# Patient Record
Sex: MALE | Race: WHITE | NOT HISPANIC OR LATINO | Employment: FULL TIME | URBAN - METROPOLITAN AREA
[De-identification: names, ages, dates, MRNs, and addresses within clinical notes are randomized per-mention and may not be internally consistent; named-entity substitution may affect disease eponyms.]

---

## 2024-08-23 ENCOUNTER — OFFICE VISIT (OUTPATIENT)
Dept: FAMILY MEDICINE CLINIC | Facility: CLINIC | Age: 49
End: 2024-08-23
Payer: COMMERCIAL

## 2024-08-23 VITALS
HEART RATE: 61 BPM | OXYGEN SATURATION: 97 % | RESPIRATION RATE: 18 BRPM | DIASTOLIC BLOOD PRESSURE: 88 MMHG | HEIGHT: 74 IN | SYSTOLIC BLOOD PRESSURE: 128 MMHG | BODY MASS INDEX: 40.43 KG/M2 | WEIGHT: 315 LBS

## 2024-08-23 DIAGNOSIS — Z12.11 SCREENING FOR COLON CANCER: ICD-10-CM

## 2024-08-23 DIAGNOSIS — E66.01 CLASS 3 SEVERE OBESITY DUE TO EXCESS CALORIES WITHOUT SERIOUS COMORBIDITY WITH BODY MASS INDEX (BMI) OF 40.0 TO 44.9 IN ADULT: ICD-10-CM

## 2024-08-23 DIAGNOSIS — Z13.220 SCREENING, LIPID: Primary | ICD-10-CM

## 2024-08-23 DIAGNOSIS — Z12.5 SCREENING FOR PROSTATE CANCER: ICD-10-CM

## 2024-08-23 PROCEDURE — 36415 COLL VENOUS BLD VENIPUNCTURE: CPT | Performed by: STUDENT IN AN ORGANIZED HEALTH CARE EDUCATION/TRAINING PROGRAM

## 2024-08-23 PROCEDURE — 99204 OFFICE O/P NEW MOD 45 MIN: CPT | Performed by: STUDENT IN AN ORGANIZED HEALTH CARE EDUCATION/TRAINING PROGRAM

## 2024-08-23 RX ORDER — MOMETASONE FUROATE MONOHYDRATE 50 UG/1
2 SPRAY, METERED NASAL DAILY
COMMUNITY

## 2024-08-23 RX ORDER — CETIRIZINE HYDROCHLORIDE 10 MG/1
10 TABLET ORAL DAILY
COMMUNITY

## 2024-08-23 NOTE — PROGRESS NOTES
Wyatt Dumont,   [facility]  [address]  [place] [address]  [phone]    Subjective      Name Kian Cartagena MRN [id number]    1975 AGE/SEX 49 y.o. / male      Chief Complaint Chief Complaint   Patient presents with    Establish Care     New patient here to [facility], has not had a PCP in a long time          Visit History for  2024    Kian Cartagena is a 49 y.o. male who presented today for Establish Care (New patient here to establish [facility], has not had a PCP in a long time )     History of Present Illness  The patient presents for a physical exam.    He has not visited a doctor in a significant amount of time, as he rarely falls ill. When he does, he typically consults with a telemetry doctor or visits a clinic. He is committed to maintaining regular check-ups to ensure his long-term health.    A few years ago, he experienced frequent sinus infections, which led him to an ENT specialist for allergy testing. The results indicated seasonal allergies. Since then, he has been managing his symptoms with Zyrtec and a nasal spray, which have proven effective. He has not had a sinus infection for a considerable period.    His primary health concern is his weight. As he ages, he experiences increased stiffness and mild bone aches in the mornings. However, he does not have any major health concerns at present.    He did not fast today in preparation for potential blood tests. He occasionally suffers from motion sickness during cruises, for which he uses scopolamine patches.    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 He has been with the  for over 25 years now.    FAMILY HISTORY  He denies any family history of diabetes.       Medications and Allergies   Current Outpatient Medications   Medication Instructions    cetirizine (ZYRTEC) 10 mg, Oral, Daily    mometasone (NASONEX) 50 MCG/ACT nasal spray  "2 sprays, Nasal, Daily     No Known Allergies   I have reviewed the above medications and allergies     Objective:      Vitals Vitals:    08/23/24 1309   BP: 128/88   BP Location: Left arm   Patient Position: Sitting   Cuff Size: Large Adult   Pulse: 61   Resp: 18   SpO2: 97%   Weight: (!) 149 kg (328 lb)   Height: 188 cm (74\")     Body mass index is 42.11 kg/m².    Physical Exam  Vitals reviewed.   Constitutional:       General: He is not in acute distress.     Appearance: He is not ill-appearing.   HENT:      Right Ear: Tympanic membrane, ear canal and external ear normal.      Left Ear: Tympanic membrane, ear canal and external ear normal.   Cardiovascular:      Rate and Rhythm: Normal rate and regular rhythm.   Pulmonary:      Effort: Pulmonary effort is normal.      Breath sounds: Normal breath sounds.   Neurological:      Mental Status: He is alert.   Psychiatric:         Mood and Affect: Mood normal.         Behavior: Behavior normal.         Thought Content: Thought content normal.         Judgment: Judgment normal.        Physical Exam       Results       Assessment/Plan   Issues Addressed/ Plan   Diagnosis Plan   1. Screening, lipid  Lipid Panel      2. Class 3 severe obesity due to excess calories without serious comorbidity with body mass index (BMI) of 40.0 to 44.9 in adult  Comprehensive Metabolic Panel    Lipid Panel      3. Screening for prostate cancer  PSA SCREENING      4. Screening for colon cancer  Ambulatory Referral For Screening Colonoscopy         Assessment & Plan  1. Seasonal Allergies.  He reports a history of seasonal allergies, which have been well-controlled with Zyrtec and nasal spray. He has not experienced a sinus infection in a long time. He should continue his current regimen.    2. Health Maintenance.  He is due for a Tdap vaccine and a COVID-19 booster. The pneumonia vaccine is up-to-date, with the next one scheduled at age 67. A baseline PSA test is recommended. A colonoscopy " has been ordered as he is now 49 years old, and the guidelines recommend screening starting at age 45. Blood work will be conducted today to assess liver and kidney function, cholesterol levels, and prostate health. The results will be communicated via [facility].    Follow-up  A follow-up visit is scheduled in 3 months for a physical exam.     Class 3 Severe Obesity (BMI >=40). Obesity-related health conditions include the following: none. Obesity is unchanged. BMI is is above average; BMI management plan is completed. We discussed portion control and increasing exercise.     There are no Patient Instructions on file for this visit.   Follow up  recommended Return in about 3 months (around 11/23/2024) for Annual physical.   - Dragon voice recognition software was utilized to complete this chart.  Every reasonable attempt was made to edit and correct the text, however some incorrect words may remain.   Wyatt Dumont DO    Patient or patient representative verbalized consent for the use of Ambient Listening during the visit with  Wyatt Dumont DO for chart documentation. 9/2/2024  00:20 EDT

## 2024-08-23 NOTE — PROGRESS NOTES
Venipuncture Blood Specimen Collection  Venipuncture performed in LEFT ARM by Yeny Ahumada MA with good hemostasis. Patient tolerated the procedure well without complications.   08/23/24   Yeny Ahumada MA

## 2024-08-24 LAB
ALBUMIN SERPL-MCNC: 4.5 G/DL (ref 3.5–5.2)
ALBUMIN/GLOB SERPL: 1.7 G/DL
ALP SERPL-CCNC: 53 U/L (ref 39–117)
ALT SERPL-CCNC: 19 U/L (ref 1–41)
AST SERPL-CCNC: 12 U/L (ref 1–40)
BILIRUB SERPL-MCNC: 0.9 MG/DL (ref 0–1.2)
BUN SERPL-MCNC: 14 MG/DL (ref 6–20)
BUN/CREAT SERPL: 15.6 (ref 7–25)
CALCIUM SERPL-MCNC: 9.5 MG/DL (ref 8.6–10.5)
CHLORIDE SERPL-SCNC: 102 MMOL/L (ref 98–107)
CHOLEST SERPL-MCNC: 153 MG/DL (ref 0–200)
CO2 SERPL-SCNC: 29.4 MMOL/L (ref 22–29)
CREAT SERPL-MCNC: 0.9 MG/DL (ref 0.76–1.27)
EGFRCR SERPLBLD CKD-EPI 2021: 106 ML/MIN/1.73
GLOBULIN SER CALC-MCNC: 2.6 GM/DL
GLUCOSE SERPL-MCNC: 90 MG/DL (ref 65–99)
HDLC SERPL-MCNC: 46 MG/DL (ref 40–60)
LDLC SERPL CALC-MCNC: 86 MG/DL (ref 0–100)
POTASSIUM SERPL-SCNC: 4.8 MMOL/L (ref 3.5–5.2)
PROT SERPL-MCNC: 7.1 G/DL (ref 6–8.5)
PSA SERPL-MCNC: 0.3 NG/ML (ref 0–4)
SODIUM SERPL-SCNC: 141 MMOL/L (ref 136–145)
TRIGL SERPL-MCNC: 114 MG/DL (ref 0–150)
VLDLC SERPL CALC-MCNC: 21 MG/DL (ref 5–40)

## 2025-01-07 ENCOUNTER — OFFICE VISIT (OUTPATIENT)
Dept: FAMILY MEDICINE CLINIC | Facility: CLINIC | Age: 50
End: 2025-01-07
Payer: COMMERCIAL

## 2025-01-07 VITALS
HEIGHT: 74 IN | DIASTOLIC BLOOD PRESSURE: 72 MMHG | OXYGEN SATURATION: 96 % | SYSTOLIC BLOOD PRESSURE: 108 MMHG | WEIGHT: 315 LBS | BODY MASS INDEX: 40.43 KG/M2 | RESPIRATION RATE: 18 BRPM | HEART RATE: 62 BPM

## 2025-01-07 DIAGNOSIS — Z00.00 ENCOUNTER FOR WELL ADULT EXAM WITHOUT ABNORMAL FINDINGS: Primary | ICD-10-CM

## 2025-01-07 PROCEDURE — 99396 PREV VISIT EST AGE 40-64: CPT | Performed by: STUDENT IN AN ORGANIZED HEALTH CARE EDUCATION/TRAINING PROGRAM

## 2025-01-07 RX ORDER — FLUTICASONE PROPIONATE 50 MCG
2 SPRAY, SUSPENSION (ML) NASAL DAILY
COMMUNITY

## 2025-01-07 NOTE — PROGRESS NOTES
Wyatt Dumont DO  [facility]  [address]  [place] [address] [phone]    Subjective      Name Kian Cartagena MRN [id number]    1975 AGE/SEX 49 y.o. / male      Chief Complaint Chief Complaint   Patient presents with    Annual Exam        Visit History for  2025    History of Present Illness  iKan Cartagena 49 y.o. male who presents for an Annual Wellness Visit. He has a history of There is no problem list on file for this patient.    History of Present Illness  The patient presents for a routine checkup.    He reports experiencing soreness in his buttocks, hamstrings, and lower back, which he attributes to shoveling snow from his driveway. He has been managing this discomfort with ibuprofen. He is currently focused on weight loss and maintaining his health, with a particular emphasis on avoiding major injuries. His diet is generally balanced, with a preference for chicken or other proteins, rice, and vegetables for lunch, and high-protein foods for dinner. He admits to consuming more alcohol than usual during the holiday season but does not believe it was excessive. His exercise routine includes 45 minutes of cardio and 45 to 90 minutes of strength training, 4 to 5 days a week. He has been consistent with this routine, even during vacations. He has not exercised since Saturday but considers the snow removal activity as a form of workout. He has been incorporating more nuts, avocados, and fish into his diet to increase his HDL cholesterol levels. He reports no issues with urination or bowel movements. He acknowledges being overweight but notes that his blood work results have always been normal. He does not engage in binge eating and typically consumes a light breakfast and a sensible lunch. He has been incorporating more knee extensions and squats into his workout routine, which initially caused some discomfort but have since improved his overall  condition. He also performs back exercises, including rows and extensions. He occasionally does deadlifts but avoids heavy lifting to prevent injury.           Current Life Goals: Would like to move up in work.  Retire in 10 years.  Would like to lose some weight and stay injury free     Patient Care Team:  Wyatt Dumont, DO as PCP - General (Family Medicine)      Health Habits     Diet & Exercise:       Diet:     [x] Generally Healthy   [] Low Carb    [] Vegetarian     Did well over the holidays.       Exercise:     Type: cardiovascular workout on exercise equipment and weightlifting  Frequency: 5 times/week.       Tobacco Use:     Social History     Tobacco Use   Smoking Status Never    Passive exposure: Never   Smokeless Tobacco Never   Tobacco Comments    None        Kian Cartagena  reports that he has never smoked. He has never been exposed to tobacco smoke. He has never used smokeless tobacco.               Alcohol Use:     Social History     Substance and Sexual Activity   Alcohol Use Yes    Alcohol/week: 2.0 standard drinks of alcohol    Types: 2 Cans of beer per week    Comment: social         Counseling Given: [] Yes  [x] No       Dental Exam:   [] Up to date  [x] Scheduled  [] Needed   Last Exam: Jan 2025     Eye Exam:   [] Up to date  [x] Scheduled  [] Needed   Last Exam: Feb 2025     Screenings:     PHQ-9 Depression Screening  Little interest or pleasure in doing things? Not at all   Feeling down, depressed, or hopeless? Not at all   PHQ-2 Total Score 0   Trouble falling or staying asleep, or sleeping too much?     Feeling tired or having little energy?     Poor appetite or overeating?     Feeling bad about yourself - or that you are a failure or have let yourself or your family down?     Trouble concentrating on things, such as reading the newspaper or watching television?     Moving or speaking so slowly that other people could have noticed? Or the opposite - being so fidgety or restless that you have  "been moving around a lot more than usual?     Thoughts that you would be better off dead, or of hurting yourself in some way?     PHQ-9 Total Score     If you checked off any problems, how difficult have these problems made it for you to do your work, take care of things at home, or get along with other people?          Hepatitis C Screening:   No results found for: \"HEPCVIRUSABY\"    Lung Cancer Screening: Qualifies? [] Yes  [x] No   Completed:    Colon Cancer Screening:   Last Completed Colonoscopy       This patient has no relevant Health Maintenance data.             Prostate Cancer Screening:   PSA   Date Value Ref Range Status   08/23/2024 0.296 0.000 - 4.000 ng/mL Final     Comment:     Testing Method: [employer] Electrochemiluminescence  Immunoassay(ECLIA)  Values obtained with different assay methods or kits cannot  be used interchangeably.            Advance Care Planning  Patient does not have an advance directive, information provided.    Review of Systems    The following portions of the patient's history were reviewed and updated as appropriate: allergies, current medications, past family history, past medical history, past social history, past surgical history and problem list.     Past Medical, Family, Social History     Medical History: has a past medical history of Allergic, Kidney stone (2012), and Obesity.   Surgical History: has a past surgical history that includes Appendectomy (2018).   Family History: family history includes Alcohol abuse in his father; Clotting disorder in his father.   Social History: reports that he has never smoked. He has never been exposed to tobacco smoke. He has never used smokeless tobacco. He reports current alcohol use of about 2.0 standard drinks of alcohol per week. He reports that he does not use drugs.       Medications and Allergies   Current Outpatient Medications   Medication Instructions    cetirizine (ZYRTEC) 10 mg, Daily    fluticasone (FLONASE) 50 " "MCG/ACT nasal spray 2 sprays, Daily    mometasone (NASONEX) 50 MCG/ACT nasal spray 2 sprays, Daily     No Known Allergies       Objective:      Vitals Vitals:    01/07/25 1405   BP: 108/72   BP Location: Left arm   Patient Position: Sitting   Cuff Size: Large Adult   Pulse: 62   Resp: 18   SpO2: 96%   Weight: (!) 152 kg (334 lb)   Height: 188 cm (74\")     Body mass index is 42.88 kg/m².    Physical Exam  Vitals reviewed.   Constitutional:       General: He is not in acute distress.     Appearance: He is not ill-appearing.   Cardiovascular:      Rate and Rhythm: Normal rate and regular rhythm.   Pulmonary:      Effort: Pulmonary effort is normal.      Breath sounds: Normal breath sounds.   Neurological:      Mental Status: He is alert.   Psychiatric:         Mood and Affect: Mood normal.         Behavior: Behavior normal.         Thought Content: Thought content normal.         Judgment: Judgment normal.            Assessment/Plan      Issues Addressed/ Plan   Diagnosis Plan   1. Encounter for well adult exam without abnormal findings          Assessment & Plan  1. Health maintenance.  His last laboratory results were within normal limits, negating the need for immediate repetition. His blood pressure readings are consistently within the normal range. His cholesterol levels are commendable, with a notable increase in his HDL levels. He is currently on a path to weight loss and is making commendable progress. His overall health status is satisfactory. He was advised to continue his current health regimen, including regular exercise and a balanced diet. The importance of maintaining a healthy weight through mindful eating habits was emphasized. He was also encouraged to use ibuprofen prophylactically to manage post-exercise aches and pains.    Follow-up  The patient will follow up in 1 year, or earlier if necessary.     Discussion:    Wears seat belt? [x] Yes  [] No     Wears sunscreen? [x] Yes  [] No      BMI: Body " mass index is 42.88 kg/m².             There are no Patient Instructions on file for this visit.      Follow up  recommended Return in about 1 year (around 1/7/2026) for Annual physical.     Wyatt Dumont DO